# Patient Record
Sex: MALE | Race: WHITE | Employment: UNEMPLOYED | URBAN - METROPOLITAN AREA
[De-identification: names, ages, dates, MRNs, and addresses within clinical notes are randomized per-mention and may not be internally consistent; named-entity substitution may affect disease eponyms.]

---
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
## 2024-08-16 ENCOUNTER — HOSPITAL ENCOUNTER (OUTPATIENT)
Age: 19
End: 2024-08-16
Payer: COMMERCIAL

## 2024-08-16 DIAGNOSIS — M79.602 LEFT ARM PAIN: ICD-10-CM

## 2024-08-16 PROCEDURE — 73090 X-RAY EXAM OF FOREARM: CPT

## 2024-08-16 NOTE — RESULT ENCOUNTER NOTE
No fracture seen on xray. Use Voltaren as ordered, apply ice, decrease un-necessary activity with the left arm over the weekend. If not improving in 1 week will refer to PT. Dad or patient to call.